# Patient Record
Sex: MALE | Race: WHITE | Employment: OTHER | URBAN - METROPOLITAN AREA
[De-identification: names, ages, dates, MRNs, and addresses within clinical notes are randomized per-mention and may not be internally consistent; named-entity substitution may affect disease eponyms.]

---

## 2021-06-29 ENCOUNTER — THERAPY VISIT (OUTPATIENT)
Dept: PHYSICAL THERAPY | Facility: CLINIC | Age: 86
End: 2021-06-29
Payer: COMMERCIAL

## 2021-06-29 DIAGNOSIS — M25.511 ACUTE PAIN OF RIGHT SHOULDER: ICD-10-CM

## 2021-06-29 PROCEDURE — 97161 PT EVAL LOW COMPLEX 20 MIN: CPT | Mod: GP | Performed by: PHYSICAL THERAPIST

## 2021-06-29 PROCEDURE — 97110 THERAPEUTIC EXERCISES: CPT | Mod: GP | Performed by: PHYSICAL THERAPIST

## 2021-06-29 NOTE — PROGRESS NOTES
Physical Therapy Initial Evaluation  Subjective:  The history is provided by the patient. No  was used.   Patient Health History  Brandie Dotson being seen for R arm/shoulder, s/p nondisplaced fx of humeral neck and greater tuberosity.     Date of Onset:  February 2021.   Problem occurred: fall   Pain is reported as 1/10 on pain scale.  General health as reported by patient is good.  Pertinent medical history includes: diabetes, heart problems, history of fractures, implanted device and sleep disorder/apnea (stent).   Red flags:  Foot drop (foot drop improving following cortisone injections).  Medical allergies: none.   Surgeries include:  Heart surgery. Other surgery history details: stent.    Current medications: see scan.    Current occupation is retired.   Primary job tasks include:  Prolonged sitting and repetitive tasks (every day tasks).                  Therapist Generated HPI Evaluation  Problem details: Brandie was referred to PT on 6/18/21 by Grace Avitia PA-C for evaluation and treatment of R arm following nondisplaced fracture of R humeral neck and greater tuberosity.  Brandie reports that .         Type of problem:  Right shoulder.    This is a new condition.  Condition occurred with:  A fall.  Where condition occurred: in the community.  Patient reports pain:  Anterior and upper arm.  Pain is described as sharp and is intermittent.  Pain radiates to:  Upper arm and shoulder. Pain timing: activity-dependent.  Since onset symptoms are gradually improving.  Associated symptoms:  Loss of motion/stiffness, painful arc and loss of strength. Symptoms are exacerbated by lifting, using arm behind back, using arm overhead, lying on extremity, certain positions and carrying  and relieved by rest.  Special tests included:  X-ray.  Previous treatment includes physical therapy (home PT since March). There was significant improvement following previous treatment.  Work activity restrictions:  retired.  Barriers include:  None as reported by patient.                        Objective:  System                   Shoulder Evaluation:  ROM:  AROM:    Flexion:  Left:  136    Right:  107  Extension: Left: 32Right: 32  Abduction:  Left: 154   Right:  110 + mild pain      External Rotation:  Left:  48    Right:  58            Extension/Internal Rotation:  Left:  T9    Right:  L5          Strength:    Flexion: Right: 5-/5     Pain:   Extension:  Right: 5/5    Pain:  Abduction:  Right: 4+/5      Pain:+  Adduction:  Right: 5/5   Strong/pain free    Pain:  Internal Rotation:  Right: 5/5   Strong/pain free    Pain:  External Rotation:   Right:4-/5      Pain:+        Elbow Flexion:  Right:5/5   Strong/pain free    Pain:  Elbow Extension:  Right:5/5      Pain:+      Palpation:      Right shoulder tenderness present at: Biceps and Bicipital Groove                                     General     ROS    Assessment/Plan:    Patient is a 86 year old male with right side shoulder complaints.    Patient has the following significant findings with corresponding treatment plan.                Diagnosis 1:  R shoulder pain, s/p nondisplaced fx of humerus  Pain -  hot/cold therapy, manual therapy, self management, education, directional preference exercise and home program  Decreased ROM/flexibility - manual therapy, therapeutic exercise and home program  Decreased joint mobility - manual therapy, therapeutic exercise and home program  Decreased strength - therapeutic exercise, therapeutic activities and home program  Impaired muscle performance - neuro re-education and home program  Decreased function - therapeutic activities and home program  Impaired posture - neuro re-education and home program    Therapy Evaluation Codes:   1) History comprised of:   Personal factors that impact the plan of care:      None.    Comorbidity factors that impact the plan of care are:      None.     Medications impacting care: None.  2) Examination  of Body Systems comprised of:   Body structures and functions that impact the plan of care:      Shoulder.   Activity limitations that impact the plan of care are:      Bathing, Lifting and reaching.  3) Clinical presentation characteristics are:   Stable/Uncomplicated.  4) Decision-Making    Low complexity using standardized patient assessment instrument and/or measureable assessment of functional outcome.  Cumulative Therapy Evaluation is: Low complexity.    Previous and current functional limitations:  (See Goal Flow Sheet for this information)    Short term and Long term goals: (See Goal Flow Sheet for this information)     Communication ability:  Patient appears to be able to clearly communicate and understand verbal and written communication and follow directions correctly.  Treatment Explanation - The following has been discussed with the patient:   RX ordered/plan of care  Anticipated outcomes  Possible risks and side effects  This patient would benefit from PT intervention to resume normal activities.   Rehab potential is excellent.    Frequency:  1 X week, once daily  Duration:  for 6 weeks  Discharge Plan:  Achieve all LTG.  Independent in home treatment program.  Reach maximal therapeutic benefit.    Please refer to the daily flowsheet for treatment today, total treatment time and time spent performing 1:1 timed codes.

## 2021-06-29 NOTE — LETTER
NAEL [facility]  [address]  [place] [address]  [phone]    2021    Re: Brandie Dotson   :   1935  MRN:  [id number]   REFERRING PHYSICIAN:   Emiliano NAYAK [facility]    Date of Initial Evaluation: 2021  Visits:  Rxs Used: 1  Reason for Referral:  Acute pain of right shoulder  EVALUATION SUMMARY    Physical Therapy Initial Evaluation  Subjective:  The history is provided by the patient. No  was used.   Patient Health History  Brandie Dotson being seen for R arm/shoulder, s/p nondisplaced fx of humeral neck and greater tuberosity.   Date of Onset:  2021.   Problem occurred: fall   Pain is reported as 1/10 on pain scale.  General health as reported by patient is good.  Pertinent medical history includes: diabetes, heart problems, history of fractures, implanted device and sleep disorder/apnea (stent).   Red flags:  Foot drop (foot drop improving following cortisone injections).  Medical allergies: none.   Surgeries include:  Heart surgery. Other surgery history details: stent.    Current medications: see scan.    Current occupation is retired.   Primary job tasks include:  Prolonged sitting and repetitive tasks (every day tasks).                Therapist Generated HPI Evaluation  Problem details: Brandie was referred to PT on 21 by Grace Avitia PA-C for evaluation and treatment of R arm following nondisplaced fracture of R humeral neck and greater tuberosity.  Brandie reports that .         Type of problem:  Right shoulder.  This is a new condition.  Condition occurred with:  A fall.  Where condition occurred: in the community.  Patient reports pain:  Anterior and upper arm.  Pain is described as sharp and is intermittent.  Pain radiates to:  Upper arm and shoulder. Pain timing: activity-dependent.  Since onset symptoms are gradually  improving.  Associated symptoms:  Loss of motion/stiffness, painful arc and loss of strength. Symptoms are exacerbated by lifting, using arm behind back, using arm overhead, lying on extremity, certain positions and carrying  and relieved by rest.  Special tests included:  X-ray.  Previous treatment includes physical therapy (home PT since March). There was significant improvement following previous treatment.  Work activity restrictions: retired.  Barriers include:  None as reported by patient.  Re: Brandie Dotson   :   1935        Shoulder Evaluation:  AROM:    Flexion:  Left:  136    Right:  107  Extension: Left: 32Right: 32  Abduction:  Left: 154   Right:  110 + mild pain  External Rotation:  Left:  48    Right:  58  Extension/Internal Rotation:  Left:  T9    Right:  L5    Strength:    Flexion: Right: 5-/5     Pain:   Extension:  Right: 5/5    Pain:  Abduction:  Right: 4+/5      Pain:+  Adduction:  Right: 5/5   Strong/pain free    Pain:  Internal Rotation:  Right: 5/5   Strong/pain free    Pain:  External Rotation:   Right:4-/5      Pain:+    Elbow Flexion:  Right:5/5   Strong/pain free    Pain:  Elbow Extension:  Right:5/5      Pain:+  Palpation:    Right shoulder tenderness present at: Biceps and Bicipital Groove    Assessment/Plan:    Patient is a 86 year old male with right side shoulder complaints.    Patient has the following significant findings with corresponding treatment plan.                Diagnosis 1:  R shoulder pain, s/p nondisplaced fx of humerus  Pain -  hot/cold therapy, manual therapy, self management, education, directional preference exercise and home program  Decreased ROM/flexibility - manual therapy, therapeutic exercise and home program  Decreased joint mobility - manual therapy, therapeutic exercise and home program  Decreased strength - therapeutic exercise, therapeutic activities and home program  Impaired muscle performance - neuro re-education and home program  Decreased  function - therapeutic activities and home program  Impaired posture - neuro re-education and home program    Therapy Evaluation Codes:   1) History comprised of:   Personal factors that impact the plan of care:      None.    Comorbidity factors that impact the plan of care are:      None.     Medications impacting care: None.  2) Examination of Body Systems comprised of:   Body structures and functions that impact the plan of care:      Shoulder.   Activity limitations that impact the plan of care are:      Bathing, Lifting and reaching.  3) Clinical presentation characteristics are:   Stable/Uncomplicated.  4) Decision-Making    Low complexity using standardized patient assessment instrument and/or measureable assessment of functional outcome.  Cumulative Therapy Evaluation is: Low complexity.  Previous and current functional limitations:  (See Goal Flow Sheet for this information)    Short term and Long term goals: (See Goal Flow Sheet for this information)   Communication ability:  Patient appears to be able to clearly communicate and understand verbal and written communication and follow directions correctly.  Treatment Explanation - The following has been discussed with the patient:   RX ordered/plan of care  Re: Brandie Dotson   :   1935        Anticipated outcomes  Possible risks and side effects  This patient would benefit from PT intervention to resume normal activities.   Rehab potential is excellent.    Frequency:  1 X week, once daily  Duration:  for 6 weeks  Discharge Plan:  Achieve all LTG.  Independent in home treatment program.  Reach maximal therapeutic benefit.    Thank you for your referral.      INQUIRIES  Therapist: Rula Link DPT   [address]  [facility] [address]  Phone: [phone]  Fax: [phone]

## 2021-07-08 ENCOUNTER — THERAPY VISIT (OUTPATIENT)
Dept: PHYSICAL THERAPY | Facility: CLINIC | Age: 86
End: 2021-07-08
Payer: COMMERCIAL

## 2021-07-08 DIAGNOSIS — M25.511 ACUTE PAIN OF RIGHT SHOULDER: ICD-10-CM

## 2021-07-08 PROCEDURE — 97140 MANUAL THERAPY 1/> REGIONS: CPT | Mod: GP | Performed by: PHYSICAL THERAPIST

## 2021-07-08 PROCEDURE — 97110 THERAPEUTIC EXERCISES: CPT | Mod: GP | Performed by: PHYSICAL THERAPIST

## 2021-07-15 ENCOUNTER — THERAPY VISIT (OUTPATIENT)
Dept: PHYSICAL THERAPY | Facility: CLINIC | Age: 86
End: 2021-07-15
Payer: COMMERCIAL

## 2021-07-15 DIAGNOSIS — M25.511 ACUTE PAIN OF RIGHT SHOULDER: ICD-10-CM

## 2021-07-15 PROCEDURE — 97140 MANUAL THERAPY 1/> REGIONS: CPT | Mod: GP | Performed by: PHYSICAL THERAPIST

## 2021-07-15 PROCEDURE — 97110 THERAPEUTIC EXERCISES: CPT | Mod: GP | Performed by: PHYSICAL THERAPIST

## 2021-07-15 NOTE — PROGRESS NOTES
Subjective:  HPI  Physical Exam                    Objective:  System    Physical Exam    General     ROS    Assessment/Plan:    SUBJECTIVE  Subjective changes as noted by pt:  Can still get a bit sore in the R shoudler, but the motion is improving.        Current pain level: 0/10     Changes in function:  Yes (See Goal flowsheet attached for changes in current functional level)     Adverse reaction to treatment or activity:  None    OBJECTIVE  Changes in objective findings:  Yes, R shoulder AROM 118/100/occiput/R buttock        ASSESSMENT  Brandie continues to require intervention to meet STG and LTG's: PT  Patient is progressing as expected.  Response to therapy has shown an improvement in  pain level and ROM   Progress made towards STG/LTG?  Yes (See Goal flowsheet attached for updates on achievement of STG and LTG)    PLAN  Current treatment program is being advanced to more complex exercises.    PTA/ATC plan:  N/A    Please refer to the daily flowsheet for treatment today, total treatment time and time spent performing 1:1 timed codes.

## 2021-08-05 ENCOUNTER — THERAPY VISIT (OUTPATIENT)
Dept: PHYSICAL THERAPY | Facility: CLINIC | Age: 86
End: 2021-08-05
Payer: COMMERCIAL

## 2021-08-05 DIAGNOSIS — M25.511 ACUTE PAIN OF RIGHT SHOULDER: ICD-10-CM

## 2021-08-05 PROCEDURE — 97140 MANUAL THERAPY 1/> REGIONS: CPT | Mod: GP | Performed by: PHYSICAL THERAPIST

## 2021-08-05 PROCEDURE — 97110 THERAPEUTIC EXERCISES: CPT | Mod: GP | Performed by: PHYSICAL THERAPIST

## 2021-08-05 NOTE — PROGRESS NOTES
Subjective:  HPI  Physical Exam                    Objective:  System    Physical Exam    General     ROS    Assessment/Plan:    SUBJECTIVE  Subjective changes as noted by pt:  Brandie reports that he continues to have soreness in the R upper arm. The exercises are getting easier to perform. Sleeping better now in bed.        Current pain level: 2/10     Changes in function:  Yes (See Goal flowsheet attached for changes in current functional level)     Adverse reaction to treatment or activity:  None    OBJECTIVE  Changes in objective findings:  Yes, R shoulder AAROM    Flexion 126  Abduction 140  Ext/IR L5  ER supine PROM 43        ASSESSMENT  Brandie continues to require intervention to meet STG and LTG's: PT  Patient is progressing as expected.  Response to therapy has shown an improvement in  pain level and ROM   Progress made towards STG/LTG?  Yes (See Goal flowsheet attached for updates on achievement of STG and LTG)    PLAN  Continue current treatment plan until patient demonstrates readiness to progress to higher level exercises.    PTA/ATC plan:  N/A    Please refer to the daily flowsheet for treatment today, total treatment time and time spent performing 1:1 timed codes.

## 2021-08-11 ENCOUNTER — THERAPY VISIT (OUTPATIENT)
Dept: PHYSICAL THERAPY | Facility: CLINIC | Age: 86
End: 2021-08-11
Payer: COMMERCIAL

## 2021-08-11 DIAGNOSIS — M25.511 ACUTE PAIN OF RIGHT SHOULDER: ICD-10-CM

## 2021-08-11 PROCEDURE — 97110 THERAPEUTIC EXERCISES: CPT | Mod: GP | Performed by: PHYSICAL THERAPIST

## 2021-08-11 NOTE — LETTER
NAEL [facility]  [address]  [facility] [place] [facility] [address]  [phone]    2021    Re: Brandie Dotson   :   1935  MRN:  [id number]   REFERRING PHYSICIAN:   Emiliano NAYAK [facility]  Date of Initial Evaluation:  2021  Visits:  Rxs Used: 5  Reason for Referral:  Acute pain of right shoulder    EVALUATION SUMMARY    PROGRESS  REPORT    Progress reporting period is from 21 to 21.       SUBJECTIVE  Subjective changes noted by patient:  He feels like his motion is quite a bit better but he hasn't been as consistent with his HEP at home and his wife wants this to be his last visit as it is very hard to get to therapy.       Current pain level is 1/10  .     Previous pain level was  5/10  .   Changes in function:  Yes (See Goal flowsheet attached for changes in current functional level)  Adverse reaction to treatment or activity: None    OBJECTIVE  Changes noted in objective findings:  Yes,   R shoulder AROM: 128/130/L4/45  L shoulder AROM: 138/135/L1/50  R shoulder strength: 4/4/4/4    ASSESSMENT/PLAN  Updated problem list and treatment plan: Diagnosis 1:  R shoulder humeral head fx    Pain -  self management, education, directional preference exercise and home program  Decreased joint mobility - manual therapy and therapeutic exercise  Decreased strength - therapeutic exercise and therapeutic activities  STG/LTGs have been met or progress has been made towards goals:  Yes (See Goal flow sheet completed today.)  Assessment of Progress: The patient's condition is improving.  The patient's condition has potential to improve.  Self Management Plans:  Patient has been instructed in a home treatment program.  Patient is independent in a home treatment program.  Patient  has been instructed in self management of symptoms.  Patient is independent in self management of symptoms.  The  family/caregiver has been instructed in home continuation of care.  I have re-evaluated this patient and find that the nature, scope, duration and intensity of the therapy is appropriate for the medical condition of the patient.  Re: Brandie AYALA Mauri   :   1935    Brandie continues to require the following intervention to meet STG and LTG's:  PT intervention is no longer required to meet STG/LTG.    Recommendations:  This patient is ready to be discharged from therapy and continue their home treatment program.  His wife said it was very difficult to get him here and since he was feeling better and wasn't consistent with doing his exercises, she wanted to cancel his last 2 appointments.             Thank you for your referral.    INQUIRIES  Therapist:Shanae Torres PT  [address]  [facility] [address]  Phone: [phone]  Fax: [phone]

## 2021-08-11 NOTE — PROGRESS NOTES
"Subjective:  HPI  Physical Exam                    Objective:  System    Physical Exam    General     ROS    Assessment/Plan:    PROGRESS  REPORT    Progress reporting period is from 6-29-21 to 8-11-21.       SUBJECTIVE  Subjective changes noted by patient:  He feels like his motion is quite a bit better but he hasn't been as consistent with his HEP at home and his wife wants this to be his last visit as it is very hard to get to therapy  .       Current pain level is {PAIN LEVEL (SCALE OF 10):530401}  .     Previous pain level was  {PAIN LEVEL (SCALE OF 10):588591}  .   Changes in function:  {Changes in Function:719090}  Adverse reaction to treatment or activity: {Adverse reaction to treatment or activity:809179::\"None\"}    OBJECTIVE  Changes noted in objective findings:  {Changes noted in objective findings:659147}        ASSESSMENT/PLAN  Updated problem list and treatment plan: {DIAGNOSIS/PLAN REHAB:542469}  STG/LTGs have been met or progress has been made towards goals:  {Goals met/progress made:618420::\"Yes (See Goal flow sheet completed today.)\"}  Assessment of Progress: {Assessment of progress:557716}  Self Management Plans:  {Self Management Plans:962610}  {Appropriateness of Rx:954292}  Brandie continues to require the following intervention to meet STG and LTG's:  {INTERVENTION REHAB:755666}    Recommendations:  {RECOMMENDATIONS REHAB:954285}    Please refer to the daily flowsheet for treatment today, total treatment time and time spent performing 1:1 timed codes.          "

## 2021-08-12 NOTE — PROGRESS NOTES
Subjective:  HPI  Physical Exam                    Objective:  System    Physical Exam    General     ROS    Assessment/Plan:    PROGRESS  REPORT    Progress reporting period is from 6-29-21 to 8-11-21.       SUBJECTIVE  Subjective changes noted by patient:  He feels like his motion is quite a bit better but he hasn't been as consistent with his HEP at home and his wife wants this to be his last visit as it is very hard to get to therapy  .       Current pain level is 1/10  .     Previous pain level was  5/10  .   Changes in function:  Yes (See Goal flowsheet attached for changes in current functional level)  Adverse reaction to treatment or activity: None    OBJECTIVE  Changes noted in objective findings:  Yes,     R shoulder AROM: 128/130/L4/45  L shoulder AROM: 138/135/L1/50    R shoulder strength: 4/4/4/4        ASSESSMENT/PLAN  Updated problem list and treatment plan: Diagnosis 1:  R shoulder humeral head fx    Pain -  self management, education, directional preference exercise and home program  Decreased joint mobility - manual therapy and therapeutic exercise  Decreased strength - therapeutic exercise and therapeutic activities  STG/LTGs have been met or progress has been made towards goals:  Yes (See Goal flow sheet completed today.)  Assessment of Progress: The patient's condition is improving.  The patient's condition has potential to improve.  Self Management Plans:  Patient has been instructed in a home treatment program.  Patient is independent in a home treatment program.  Patient  has been instructed in self management of symptoms.  Patient is independent in self management of symptoms.  The family/caregiver has been instructed in home continuation of care.  I have re-evaluated this patient and find that the nature, scope, duration and intensity of the therapy is appropriate for the medical condition of the patient.  Brandie continues to require the following intervention to meet STG and LTG's:  PT  intervention is no longer required to meet STG/LTG.    Recommendations:  This patient is ready to be discharged from therapy and continue their home treatment program.    His wife said it was very difficult to get him here and since he was feeling better and wasn't consistent with doing his exercises, she wanted to cancel his last 2 appointments.     Please refer to the daily flowsheet for treatment today, total treatment time and time spent performing 1:1 timed codes.

## 2022-10-18 PROCEDURE — 81001 URINALYSIS AUTO W/SCOPE: CPT | Mod: ORL | Performed by: PHYSICIAN ASSISTANT

## 2023-01-01 ENCOUNTER — LAB REQUISITION (OUTPATIENT)
Dept: LAB | Facility: CLINIC | Age: 88
End: 2023-01-01
Payer: COMMERCIAL

## 2023-01-01 DIAGNOSIS — R82.90 UNSPECIFIED ABNORMAL FINDINGS IN URINE: ICD-10-CM

## 2023-01-01 LAB
ALBUMIN UR-MCNC: NEGATIVE MG/DL
APPEARANCE UR: CLEAR
BILIRUB UR QL STRIP: NEGATIVE
CAOX CRY #/AREA URNS HPF: ABNORMAL /HPF
COLOR UR AUTO: ABNORMAL
GLUCOSE UR STRIP-MCNC: NEGATIVE MG/DL
HGB UR QL STRIP: NEGATIVE
KETONES UR STRIP-MCNC: NEGATIVE MG/DL
LEUKOCYTE ESTERASE UR QL STRIP: NEGATIVE
NITRATE UR QL: NEGATIVE
PH UR STRIP: 6.5 [PH] (ref 5–7)
RBC URINE: 1 /HPF
SP GR UR STRIP: 1.02 (ref 1–1.03)
SQUAMOUS EPITHELIAL: <1 /HPF
UROBILINOGEN UR STRIP-MCNC: NORMAL MG/DL
WBC URINE: 2 /HPF

## 2023-01-01 PROCEDURE — 81001 URINALYSIS AUTO W/SCOPE: CPT | Mod: ORL | Performed by: PHYSICIAN ASSISTANT
